# Patient Record
Sex: FEMALE | Race: AMERICAN INDIAN OR ALASKA NATIVE | NOT HISPANIC OR LATINO | URBAN - METROPOLITAN AREA
[De-identification: names, ages, dates, MRNs, and addresses within clinical notes are randomized per-mention and may not be internally consistent; named-entity substitution may affect disease eponyms.]

---

## 2022-05-21 ENCOUNTER — OUTPATIENT (OUTPATIENT)
Dept: OUTPATIENT SERVICES | Facility: HOSPITAL | Age: 32
LOS: 1 days | End: 2022-05-21
Payer: COMMERCIAL

## 2022-05-21 DIAGNOSIS — Z01.818 ENCOUNTER FOR OTHER PREPROCEDURAL EXAMINATION: ICD-10-CM

## 2022-05-21 LAB
ALBUMIN SERPL ELPH-MCNC: 3.6 G/DL — SIGNIFICANT CHANGE UP (ref 3.3–5)
ALP SERPL-CCNC: 117 U/L — SIGNIFICANT CHANGE UP (ref 40–120)
ALT FLD-CCNC: 16 U/L — SIGNIFICANT CHANGE UP (ref 10–45)
ANION GAP SERPL CALC-SCNC: 11 MMOL/L — SIGNIFICANT CHANGE UP (ref 5–17)
APTT BLD: 29.4 SEC — SIGNIFICANT CHANGE UP (ref 27.5–35.5)
AST SERPL-CCNC: 20 U/L — SIGNIFICANT CHANGE UP (ref 10–40)
BILIRUB SERPL-MCNC: 0.4 MG/DL — SIGNIFICANT CHANGE UP (ref 0.2–1.2)
BLD GP AB SCN SERPL QL: NEGATIVE — SIGNIFICANT CHANGE UP
BLD GP AB SCN SERPL QL: NEGATIVE — SIGNIFICANT CHANGE UP
BUN SERPL-MCNC: 12 MG/DL — SIGNIFICANT CHANGE UP (ref 7–23)
CALCIUM SERPL-MCNC: 9.6 MG/DL — SIGNIFICANT CHANGE UP (ref 8.4–10.5)
CHLORIDE SERPL-SCNC: 103 MMOL/L — SIGNIFICANT CHANGE UP (ref 96–108)
CO2 SERPL-SCNC: 21 MMOL/L — LOW (ref 22–31)
CREAT SERPL-MCNC: 0.89 MG/DL — SIGNIFICANT CHANGE UP (ref 0.5–1.3)
EGFR: 88 ML/MIN/1.73M2 — SIGNIFICANT CHANGE UP
GLUCOSE SERPL-MCNC: 81 MG/DL — SIGNIFICANT CHANGE UP (ref 70–99)
HCT VFR BLD CALC: 36.1 % — SIGNIFICANT CHANGE UP (ref 34.5–45)
HGB BLD-MCNC: 11.9 G/DL — SIGNIFICANT CHANGE UP (ref 11.5–15.5)
INR BLD: 0.83 — LOW (ref 0.88–1.16)
MCHC RBC-ENTMCNC: 30.9 PG — SIGNIFICANT CHANGE UP (ref 27–34)
MCHC RBC-ENTMCNC: 33 GM/DL — SIGNIFICANT CHANGE UP (ref 32–36)
MCV RBC AUTO: 93.8 FL — SIGNIFICANT CHANGE UP (ref 80–100)
NRBC # BLD: 0 /100 WBCS — SIGNIFICANT CHANGE UP (ref 0–0)
PLATELET # BLD AUTO: 123 K/UL — LOW (ref 150–400)
POTASSIUM SERPL-MCNC: 4.7 MMOL/L — SIGNIFICANT CHANGE UP (ref 3.5–5.3)
POTASSIUM SERPL-SCNC: 4.7 MMOL/L — SIGNIFICANT CHANGE UP (ref 3.5–5.3)
PROT SERPL-MCNC: 6.9 G/DL — SIGNIFICANT CHANGE UP (ref 6–8.3)
PROTHROM AB SERPL-ACNC: 9.9 SEC — LOW (ref 10.5–13.4)
RBC # BLD: 3.85 M/UL — SIGNIFICANT CHANGE UP (ref 3.8–5.2)
RBC # FLD: 12.5 % — SIGNIFICANT CHANGE UP (ref 10.3–14.5)
RH IG SCN BLD-IMP: POSITIVE — SIGNIFICANT CHANGE UP
RH IG SCN BLD-IMP: POSITIVE — SIGNIFICANT CHANGE UP
SODIUM SERPL-SCNC: 135 MMOL/L — SIGNIFICANT CHANGE UP (ref 135–145)
WBC # BLD: 7.11 K/UL — SIGNIFICANT CHANGE UP (ref 3.8–10.5)
WBC # FLD AUTO: 7.11 K/UL — SIGNIFICANT CHANGE UP (ref 3.8–10.5)

## 2022-05-21 PROCEDURE — 85610 PROTHROMBIN TIME: CPT

## 2022-05-21 PROCEDURE — 80053 COMPREHEN METABOLIC PANEL: CPT

## 2022-05-21 PROCEDURE — 85730 THROMBOPLASTIN TIME PARTIAL: CPT

## 2022-05-21 PROCEDURE — 86900 BLOOD TYPING SEROLOGIC ABO: CPT

## 2022-05-21 PROCEDURE — 86901 BLOOD TYPING SEROLOGIC RH(D): CPT

## 2022-05-21 PROCEDURE — 86780 TREPONEMA PALLIDUM: CPT

## 2022-05-21 PROCEDURE — 86850 RBC ANTIBODY SCREEN: CPT

## 2022-05-21 PROCEDURE — 85027 COMPLETE CBC AUTOMATED: CPT

## 2022-05-22 LAB — T PALLIDUM AB TITR SER: NEGATIVE — SIGNIFICANT CHANGE UP

## 2022-05-23 ENCOUNTER — INPATIENT (INPATIENT)
Facility: HOSPITAL | Age: 32
LOS: 2 days | Discharge: HOME | End: 2022-05-26
Attending: OBSTETRICS & GYNECOLOGY | Admitting: OBSTETRICS & GYNECOLOGY
Payer: COMMERCIAL

## 2022-05-23 ENCOUNTER — RESULT REVIEW (OUTPATIENT)
Age: 32
End: 2022-05-23

## 2022-05-23 VITALS — HEIGHT: 62 IN | WEIGHT: 177.03 LBS

## 2022-05-23 LAB
BASOPHILS # BLD AUTO: 0.02 K/UL — SIGNIFICANT CHANGE UP (ref 0–0.2)
BASOPHILS NFR BLD AUTO: 0.3 % — SIGNIFICANT CHANGE UP (ref 0–2)
BLD GP AB SCN SERPL QL: NEGATIVE — SIGNIFICANT CHANGE UP
COVID-19 SPIKE DOMAIN AB INTERP: POSITIVE
COVID-19 SPIKE DOMAIN ANTIBODY RESULT: 234 U/ML — HIGH
EOSINOPHIL # BLD AUTO: 0.03 K/UL — SIGNIFICANT CHANGE UP (ref 0–0.5)
EOSINOPHIL NFR BLD AUTO: 0.4 % — SIGNIFICANT CHANGE UP (ref 0–6)
GLUCOSE BLDC GLUCOMTR-MCNC: 69 MG/DL — LOW (ref 70–99)
GLUCOSE BLDC GLUCOMTR-MCNC: 79 MG/DL — SIGNIFICANT CHANGE UP (ref 70–99)
GLUCOSE BLDC GLUCOMTR-MCNC: 80 MG/DL — SIGNIFICANT CHANGE UP (ref 70–99)
HCT VFR BLD CALC: 37.6 % — SIGNIFICANT CHANGE UP (ref 34.5–45)
HGB BLD-MCNC: 12.7 G/DL — SIGNIFICANT CHANGE UP (ref 11.5–15.5)
IMM GRANULOCYTES NFR BLD AUTO: 0.6 % — SIGNIFICANT CHANGE UP (ref 0–1.5)
LYMPHOCYTES # BLD AUTO: 1.27 K/UL — SIGNIFICANT CHANGE UP (ref 1–3.3)
LYMPHOCYTES # BLD AUTO: 17.6 % — SIGNIFICANT CHANGE UP (ref 13–44)
MCHC RBC-ENTMCNC: 31.8 PG — SIGNIFICANT CHANGE UP (ref 27–34)
MCHC RBC-ENTMCNC: 33.8 GM/DL — SIGNIFICANT CHANGE UP (ref 32–36)
MCV RBC AUTO: 94 FL — SIGNIFICANT CHANGE UP (ref 80–100)
MONOCYTES # BLD AUTO: 0.44 K/UL — SIGNIFICANT CHANGE UP (ref 0–0.9)
MONOCYTES NFR BLD AUTO: 6.1 % — SIGNIFICANT CHANGE UP (ref 2–14)
NEUTROPHILS # BLD AUTO: 5.43 K/UL — SIGNIFICANT CHANGE UP (ref 1.8–7.4)
NEUTROPHILS NFR BLD AUTO: 75 % — SIGNIFICANT CHANGE UP (ref 43–77)
NRBC # BLD: 0 /100 WBCS — SIGNIFICANT CHANGE UP (ref 0–0)
PLATELET # BLD AUTO: 131 K/UL — LOW (ref 150–400)
RBC # BLD: 4 M/UL — SIGNIFICANT CHANGE UP (ref 3.8–5.2)
RBC # FLD: 12.4 % — SIGNIFICANT CHANGE UP (ref 10.3–14.5)
RH IG SCN BLD-IMP: POSITIVE — SIGNIFICANT CHANGE UP
SARS-COV-2 IGG+IGM SERPL QL IA: 234 U/ML — HIGH
SARS-COV-2 IGG+IGM SERPL QL IA: POSITIVE
T PALLIDUM AB TITR SER: NEGATIVE — SIGNIFICANT CHANGE UP
WBC # BLD: 7.23 K/UL — SIGNIFICANT CHANGE UP (ref 3.8–10.5)
WBC # FLD AUTO: 7.23 K/UL — SIGNIFICANT CHANGE UP (ref 3.8–10.5)

## 2022-05-23 PROCEDURE — 88304 TISSUE EXAM BY PATHOLOGIST: CPT | Mod: 26

## 2022-05-23 PROCEDURE — 88307 TISSUE EXAM BY PATHOLOGIST: CPT | Mod: 26

## 2022-05-23 RX ORDER — MAGNESIUM HYDROXIDE 400 MG/1
30 TABLET, CHEWABLE ORAL
Refills: 0 | Status: DISCONTINUED | OUTPATIENT
Start: 2022-05-23 | End: 2022-05-26

## 2022-05-23 RX ORDER — CITRIC ACID/SODIUM CITRATE 300-500 MG
30 SOLUTION, ORAL ORAL ONCE
Refills: 0 | Status: COMPLETED | OUTPATIENT
Start: 2022-05-23 | End: 2022-05-23

## 2022-05-23 RX ORDER — OXYTOCIN 10 UNIT/ML
333.33 VIAL (ML) INJECTION
Qty: 20 | Refills: 0 | Status: DISCONTINUED | OUTPATIENT
Start: 2022-05-23 | End: 2022-05-23

## 2022-05-23 RX ORDER — KETOROLAC TROMETHAMINE 30 MG/ML
30 SYRINGE (ML) INJECTION EVERY 6 HOURS
Refills: 0 | Status: DISCONTINUED | OUTPATIENT
Start: 2022-05-23 | End: 2022-05-25

## 2022-05-23 RX ORDER — CITRIC ACID/SODIUM CITRATE 300-500 MG
15 SOLUTION, ORAL ORAL EVERY 6 HOURS
Refills: 0 | Status: DISCONTINUED | OUTPATIENT
Start: 2022-05-23 | End: 2022-05-23

## 2022-05-23 RX ORDER — LANOLIN
1 OINTMENT (GRAM) TOPICAL EVERY 6 HOURS
Refills: 0 | Status: DISCONTINUED | OUTPATIENT
Start: 2022-05-23 | End: 2022-05-26

## 2022-05-23 RX ORDER — LEVOTHYROXINE SODIUM 125 MCG
1 TABLET ORAL
Qty: 0 | Refills: 0 | DISCHARGE

## 2022-05-23 RX ORDER — OXYCODONE HYDROCHLORIDE 5 MG/1
5 TABLET ORAL ONCE
Refills: 0 | Status: DISCONTINUED | OUTPATIENT
Start: 2022-05-23 | End: 2022-05-26

## 2022-05-23 RX ORDER — OXYTOCIN 10 UNIT/ML
1 VIAL (ML) INJECTION
Qty: 30 | Refills: 0 | Status: DISCONTINUED | OUTPATIENT
Start: 2022-05-23 | End: 2022-05-26

## 2022-05-23 RX ORDER — OXYTOCIN 10 UNIT/ML
333.33 VIAL (ML) INJECTION
Qty: 20 | Refills: 0 | Status: DISCONTINUED | OUTPATIENT
Start: 2022-05-23 | End: 2022-05-26

## 2022-05-23 RX ORDER — FENTANYL/BUPIVACAINE/NS/PF 2MCG/ML-.1
250 PLASTIC BAG, INJECTION (ML) INJECTION
Refills: 0 | Status: DISCONTINUED | OUTPATIENT
Start: 2022-05-23 | End: 2022-05-23

## 2022-05-23 RX ORDER — CHLORHEXIDINE GLUCONATE 213 G/1000ML
1 SOLUTION TOPICAL DAILY
Refills: 0 | Status: DISCONTINUED | OUTPATIENT
Start: 2022-05-23 | End: 2022-05-25

## 2022-05-23 RX ORDER — DEXAMETHASONE 0.5 MG/5ML
4 ELIXIR ORAL EVERY 6 HOURS
Refills: 0 | Status: DISCONTINUED | OUTPATIENT
Start: 2022-05-23 | End: 2022-05-23

## 2022-05-23 RX ORDER — NALOXONE HYDROCHLORIDE 4 MG/.1ML
0.1 SPRAY NASAL
Refills: 0 | Status: DISCONTINUED | OUTPATIENT
Start: 2022-05-23 | End: 2022-05-23

## 2022-05-23 RX ORDER — IBUPROFEN 200 MG
600 TABLET ORAL EVERY 6 HOURS
Refills: 0 | Status: COMPLETED | OUTPATIENT
Start: 2022-05-23 | End: 2023-04-21

## 2022-05-23 RX ORDER — ACETAMINOPHEN 500 MG
975 TABLET ORAL
Refills: 0 | Status: DISCONTINUED | OUTPATIENT
Start: 2022-05-23 | End: 2022-05-26

## 2022-05-23 RX ORDER — ENOXAPARIN SODIUM 100 MG/ML
40 INJECTION SUBCUTANEOUS EVERY 24 HOURS
Refills: 0 | Status: DISCONTINUED | OUTPATIENT
Start: 2022-05-24 | End: 2022-05-26

## 2022-05-23 RX ORDER — ONDANSETRON 8 MG/1
4 TABLET, FILM COATED ORAL EVERY 6 HOURS
Refills: 0 | Status: DISCONTINUED | OUTPATIENT
Start: 2022-05-23 | End: 2022-05-23

## 2022-05-23 RX ORDER — AZITHROMYCIN 500 MG/1
500 TABLET, FILM COATED ORAL ONCE
Refills: 0 | Status: DISCONTINUED | OUTPATIENT
Start: 2022-05-23 | End: 2022-05-26

## 2022-05-23 RX ORDER — DIPHENHYDRAMINE HCL 50 MG
25 CAPSULE ORAL EVERY 6 HOURS
Refills: 0 | Status: DISCONTINUED | OUTPATIENT
Start: 2022-05-23 | End: 2022-05-26

## 2022-05-23 RX ORDER — TETANUS TOXOID, REDUCED DIPHTHERIA TOXOID AND ACELLULAR PERTUSSIS VACCINE, ADSORBED 5; 2.5; 8; 8; 2.5 [IU]/.5ML; [IU]/.5ML; UG/.5ML; UG/.5ML; UG/.5ML
0.5 SUSPENSION INTRAMUSCULAR ONCE
Refills: 0 | Status: DISCONTINUED | OUTPATIENT
Start: 2022-05-23 | End: 2022-05-26

## 2022-05-23 RX ORDER — SIMETHICONE 80 MG/1
80 TABLET, CHEWABLE ORAL EVERY 4 HOURS
Refills: 0 | Status: DISCONTINUED | OUTPATIENT
Start: 2022-05-23 | End: 2022-05-26

## 2022-05-23 RX ORDER — ASPIRIN/CALCIUM CARB/MAGNESIUM 324 MG
0 TABLET ORAL
Qty: 0 | Refills: 0 | DISCHARGE

## 2022-05-23 RX ORDER — SODIUM CHLORIDE 9 MG/ML
1000 INJECTION, SOLUTION INTRAVENOUS
Refills: 0 | Status: DISCONTINUED | OUTPATIENT
Start: 2022-05-23 | End: 2022-05-26

## 2022-05-23 RX ORDER — OXYCODONE HYDROCHLORIDE 5 MG/1
5 TABLET ORAL
Refills: 0 | Status: DISCONTINUED | OUTPATIENT
Start: 2022-05-23 | End: 2022-05-26

## 2022-05-23 RX ORDER — CEFAZOLIN SODIUM 1 G
2000 VIAL (EA) INJECTION ONCE
Refills: 0 | Status: DISCONTINUED | OUTPATIENT
Start: 2022-05-23 | End: 2022-05-26

## 2022-05-23 RX ORDER — SODIUM CHLORIDE 9 MG/ML
1000 INJECTION, SOLUTION INTRAVENOUS
Refills: 0 | Status: DISCONTINUED | OUTPATIENT
Start: 2022-05-23 | End: 2022-05-23

## 2022-05-23 RX ADMIN — Medication 250 MILLILITER(S): at 14:28

## 2022-05-23 RX ADMIN — SODIUM CHLORIDE 125 MILLILITER(S): 9 INJECTION, SOLUTION INTRAVENOUS at 08:36

## 2022-05-23 RX ADMIN — SODIUM CHLORIDE 125 MILLILITER(S): 9 INJECTION, SOLUTION INTRAVENOUS at 22:45

## 2022-05-23 RX ADMIN — Medication 30 MILLILITER(S): at 21:15

## 2022-05-23 RX ADMIN — Medication 30 MILLIGRAM(S): at 23:35

## 2022-05-23 RX ADMIN — Medication 1 MILLIUNIT(S)/MIN: at 08:51

## 2022-05-23 RX ADMIN — SODIUM CHLORIDE 125 MILLILITER(S): 9 INJECTION, SOLUTION INTRAVENOUS at 12:18

## 2022-05-23 RX ADMIN — Medication 100 MILLIGRAM(S): at 21:15

## 2022-05-23 NOTE — PATIENT PROFILE OB - FALL HARM RISK - UNIVERSAL INTERVENTIONS
Bed in lowest position, wheels locked, appropriate side rails in place/Call bell, personal items and telephone in reach/Instruct patient to call for assistance before getting out of bed or chair/Non-slip footwear when patient is out of bed/[place] to call system/Physically safe environment - no spills, clutter or unnecessary equipment/Purposeful Proactive Rounding/Room/bathroom lighting operational, light cord in reach

## 2022-05-24 LAB
HCT VFR BLD CALC: 24.7 % — LOW (ref 34.5–45)
HGB BLD-MCNC: 8.3 G/DL — LOW (ref 11.5–15.5)
MCHC RBC-ENTMCNC: 31.4 PG — SIGNIFICANT CHANGE UP (ref 27–34)
MCHC RBC-ENTMCNC: 33.6 GM/DL — SIGNIFICANT CHANGE UP (ref 32–36)
MCV RBC AUTO: 93.6 FL — SIGNIFICANT CHANGE UP (ref 80–100)
NRBC # BLD: 0 /100 WBCS — SIGNIFICANT CHANGE UP (ref 0–0)
PLATELET # BLD AUTO: 129 K/UL — LOW (ref 150–400)
RBC # BLD: 2.64 M/UL — LOW (ref 3.8–5.2)
RBC # FLD: 12.4 % — SIGNIFICANT CHANGE UP (ref 10.3–14.5)
WBC # BLD: 15.85 K/UL — HIGH (ref 3.8–10.5)
WBC # FLD AUTO: 15.85 K/UL — HIGH (ref 3.8–10.5)

## 2022-05-24 RX ADMIN — Medication 975 MILLIGRAM(S): at 15:10

## 2022-05-24 RX ADMIN — Medication 975 MILLIGRAM(S): at 18:22

## 2022-05-24 RX ADMIN — Medication 975 MILLIGRAM(S): at 21:30

## 2022-05-24 RX ADMIN — Medication 30 MILLIGRAM(S): at 12:58

## 2022-05-24 RX ADMIN — Medication 30 MILLIGRAM(S): at 18:54

## 2022-05-24 RX ADMIN — ENOXAPARIN SODIUM 40 MILLIGRAM(S): 100 INJECTION SUBCUTANEOUS at 11:39

## 2022-05-24 RX ADMIN — Medication 30 MILLIGRAM(S): at 07:19

## 2022-05-24 RX ADMIN — Medication 30 MILLIGRAM(S): at 06:49

## 2022-05-24 RX ADMIN — Medication 30 MILLIGRAM(S): at 11:39

## 2022-05-24 RX ADMIN — Medication 975 MILLIGRAM(S): at 02:45

## 2022-05-24 RX ADMIN — Medication 975 MILLIGRAM(S): at 02:15

## 2022-05-24 RX ADMIN — Medication 975 MILLIGRAM(S): at 21:03

## 2022-05-24 RX ADMIN — Medication 30 MILLIGRAM(S): at 07:18

## 2022-05-24 RX ADMIN — Medication 30 MILLIGRAM(S): at 06:48

## 2022-05-24 NOTE — LACTATION INITIAL EVALUATION - NS LACT CON REASON FOR REQ
39.1 wk gestation baby, about 15 hrs old at this time. Placed the baby STS with the mother while I provided breastfeeding education and explained normal  behaviour and the milk production feedback system. Assisted with positioning in a football hold on the left breast and taught latch strategies. Baby was able to latch deeply and is feeding well, rhythmically sucking between short pauses of rest. Mother to continue with STS when possible, room-in, and feed as per cues at least 8-12x/ day. To f/u as needed./primaparous mom/staff request

## 2022-05-24 NOTE — LACTATION INITIAL EVALUATION - LACTATION INTERVENTIONS
initiate/review safe skin-to-skin/initiate/review techniques for position and latch/post discharge community resources provided/review techniques to manage sore nipples/engorgement/reviewed components of an effective feeding and at least 8 effective feedings per day required/reviewed importance of monitoring infant diapers, the breastfeeding log, and minimum output each day/reviewed benefits and recommendations for rooming in/reviewed feeding on demand/by cue at least 8 times a day/reviewed indications of inadequate milk transfer that would require supplementation

## 2022-05-25 RX ORDER — POLYETHYLENE GLYCOL 3350 17 G/17G
17 POWDER, FOR SOLUTION ORAL ONCE
Refills: 0 | Status: DISCONTINUED | OUTPATIENT
Start: 2022-05-25 | End: 2022-05-26

## 2022-05-25 RX ORDER — IBUPROFEN 200 MG
600 TABLET ORAL EVERY 6 HOURS
Refills: 0 | Status: DISCONTINUED | OUTPATIENT
Start: 2022-05-25 | End: 2022-05-26

## 2022-05-25 RX ORDER — FERROUS SULFATE 325(65) MG
325 TABLET ORAL
Refills: 0 | Status: DISCONTINUED | OUTPATIENT
Start: 2022-05-25 | End: 2022-05-26

## 2022-05-25 RX ADMIN — Medication 600 MILLIGRAM(S): at 19:06

## 2022-05-25 RX ADMIN — Medication 30 MILLIGRAM(S): at 01:14

## 2022-05-25 RX ADMIN — Medication 600 MILLIGRAM(S): at 17:42

## 2022-05-25 RX ADMIN — Medication 1 APPLICATION(S): at 23:40

## 2022-05-25 RX ADMIN — Medication 30 MILLIGRAM(S): at 06:09

## 2022-05-25 RX ADMIN — Medication 975 MILLIGRAM(S): at 09:00

## 2022-05-25 RX ADMIN — Medication 975 MILLIGRAM(S): at 09:37

## 2022-05-25 RX ADMIN — Medication 30 MILLIGRAM(S): at 00:30

## 2022-05-25 RX ADMIN — Medication 975 MILLIGRAM(S): at 21:26

## 2022-05-25 RX ADMIN — Medication 600 MILLIGRAM(S): at 12:43

## 2022-05-25 RX ADMIN — ENOXAPARIN SODIUM 40 MILLIGRAM(S): 100 INJECTION SUBCUTANEOUS at 11:39

## 2022-05-25 RX ADMIN — Medication 975 MILLIGRAM(S): at 03:40

## 2022-05-25 RX ADMIN — Medication 600 MILLIGRAM(S): at 11:39

## 2022-05-25 RX ADMIN — Medication 975 MILLIGRAM(S): at 16:06

## 2022-05-25 RX ADMIN — Medication 975 MILLIGRAM(S): at 03:10

## 2022-05-25 RX ADMIN — Medication 975 MILLIGRAM(S): at 21:56

## 2022-05-25 RX ADMIN — Medication 975 MILLIGRAM(S): at 15:00

## 2022-05-25 RX ADMIN — Medication 30 MILLIGRAM(S): at 06:30

## 2022-05-25 RX ADMIN — Medication 600 MILLIGRAM(S): at 23:40

## 2022-05-26 VITALS
RESPIRATION RATE: 18 BRPM | OXYGEN SATURATION: 100 % | HEART RATE: 79 BPM | SYSTOLIC BLOOD PRESSURE: 126 MMHG | TEMPERATURE: 98 F | DIASTOLIC BLOOD PRESSURE: 77 MMHG

## 2022-05-26 LAB
HCT VFR BLD CALC: 25.3 % — LOW (ref 34.5–45)
HGB BLD-MCNC: 8.3 G/DL — LOW (ref 11.5–15.5)
MCHC RBC-ENTMCNC: 31.2 PG — SIGNIFICANT CHANGE UP (ref 27–34)
MCHC RBC-ENTMCNC: 32.8 GM/DL — SIGNIFICANT CHANGE UP (ref 32–36)
MCV RBC AUTO: 95.1 FL — SIGNIFICANT CHANGE UP (ref 80–100)
NRBC # BLD: 0 /100 WBCS — SIGNIFICANT CHANGE UP (ref 0–0)
PLATELET # BLD AUTO: 156 K/UL — SIGNIFICANT CHANGE UP (ref 150–400)
RBC # BLD: 2.66 M/UL — LOW (ref 3.8–5.2)
RBC # FLD: 13 % — SIGNIFICANT CHANGE UP (ref 10.3–14.5)
WBC # BLD: 11.41 K/UL — HIGH (ref 3.8–10.5)
WBC # FLD AUTO: 11.41 K/UL — HIGH (ref 3.8–10.5)

## 2022-05-26 PROCEDURE — 88307 TISSUE EXAM BY PATHOLOGIST: CPT

## 2022-05-26 PROCEDURE — 85025 COMPLETE CBC W/AUTO DIFF WBC: CPT

## 2022-05-26 PROCEDURE — 86769 SARS-COV-2 COVID-19 ANTIBODY: CPT

## 2022-05-26 PROCEDURE — 86900 BLOOD TYPING SEROLOGIC ABO: CPT

## 2022-05-26 PROCEDURE — 36415 COLL VENOUS BLD VENIPUNCTURE: CPT

## 2022-05-26 PROCEDURE — 86850 RBC ANTIBODY SCREEN: CPT

## 2022-05-26 PROCEDURE — 85027 COMPLETE CBC AUTOMATED: CPT

## 2022-05-26 PROCEDURE — 86901 BLOOD TYPING SEROLOGIC RH(D): CPT

## 2022-05-26 PROCEDURE — 88304 TISSUE EXAM BY PATHOLOGIST: CPT

## 2022-05-26 PROCEDURE — 86780 TREPONEMA PALLIDUM: CPT

## 2022-05-26 PROCEDURE — 82962 GLUCOSE BLOOD TEST: CPT

## 2022-05-26 PROCEDURE — 59050 FETAL MONITOR W/REPORT: CPT

## 2022-05-26 RX ORDER — IBUPROFEN 200 MG
1 TABLET ORAL
Qty: 0 | Refills: 0 | DISCHARGE
Start: 2022-05-26

## 2022-05-26 RX ORDER — ACETAMINOPHEN 500 MG
3 TABLET ORAL
Qty: 0 | Refills: 0 | DISCHARGE
Start: 2022-05-26

## 2022-05-26 RX ADMIN — Medication 600 MILLIGRAM(S): at 12:27

## 2022-05-26 RX ADMIN — Medication 600 MILLIGRAM(S): at 12:14

## 2022-05-26 RX ADMIN — Medication 975 MILLIGRAM(S): at 03:54

## 2022-05-26 RX ADMIN — Medication 325 MILLIGRAM(S): at 07:45

## 2022-05-26 RX ADMIN — Medication 600 MILLIGRAM(S): at 06:00

## 2022-05-26 RX ADMIN — Medication 975 MILLIGRAM(S): at 09:11

## 2022-05-26 RX ADMIN — Medication 975 MILLIGRAM(S): at 10:24

## 2022-05-26 RX ADMIN — Medication 975 MILLIGRAM(S): at 03:23

## 2022-05-26 RX ADMIN — Medication 600 MILLIGRAM(S): at 05:29

## 2022-05-26 RX ADMIN — Medication 600 MILLIGRAM(S): at 00:10

## 2022-05-26 NOTE — DISCHARGE NOTE OB - CARE PLAN
Principal Discharge DX:	Postpartum state  Assessment and plan of treatment:	POD #2 from primary C section, uncomplicated   1

## 2022-05-26 NOTE — DISCHARGE NOTE OB - HOSPITAL COURSE
POD 2 from an uncomplicated primary C section. Passing all post op milestones and hemodynamically stable for discharge.

## 2022-05-26 NOTE — PROGRESS NOTE ADULT - ASSESSMENT
31y Female POD#1    s/p C/S, Uncomplicated                                       - Neuro/Pain:  toradol atc, tylenol atc, oxy prn  - CV:  VS per routine, AM CBC pending  - Pulm: Encourage ISS & Ambulation  - GI: Advance as tolerated  - : Forrest in place, to be removed this morning, TOV this afternoon  - DVT ppx: SCDs, Lovenox 40mg QD  - Dispo: POD #2/3
31y Female POD#3    s/p C/S, Uncomplicated                                       - Neuro/Pain:  toradol atc, tylenol atc, oxy prn  - CV:  VS per routine  - Pulm: Encourage ISS & Ambulation  - GI: Advance as tolerated  - : Voiding spontaneously  - DVT ppx: SCDs, Lovenox 40mg QD  - Dispo: POD #3
31y Female POD#2    s/p C/S, Uncomplicated                                       - Neuro/Pain:  toradol atc, tylenol atc, oxy prn  - CV:  VS per routine  - Pulm: Encourage ISS & Ambulation  - GI: Advance as tolerated  - : Voiding spontaneously  - DVT ppx: SCDs, Lovenox 40mg QD  - Dispo: POD #2/3

## 2022-05-26 NOTE — PROGRESS NOTE ADULT - SUBJECTIVE AND OBJECTIVE BOX
Patient evaluated at bedside.   She reports pain is well controlled with OPM.  She has been ambulating without assistance, voiding spontaneously, passing gas, tolerating regular diet and is breastfeeding.    She denies HA, dizziness, CP, palpitations, SOB, n/v, or heavy vaginal bleeding.    Physical Exam:  Vital Signs Last 24 Hrs  T(C): 36.4 (25 May 2022 05:55), Max: 36.9 (24 May 2022 18:19)  T(F): 97.6 (25 May 2022 05:55), Max: 98.4 (24 May 2022 18:19)  HR: 101 (25 May 2022 05:55) (62 - 107)  BP: 132/85 (25 May 2022 05:55) (97/63 - 132/85)  BP(mean): --  RR: 17 (25 May 2022 05:55) (17 - 18)  SpO2: 98% (25 May 2022 05:55) (98% - 99%)    Gen: NAD  Abd: + BS, soft, nontender, nondistended, no rebound or guarding  Incision clean, dry and intact  uterus firm at midline  : lochia WNL  Extremities: no swelling or calf tenderness                          8.3    15.85 )-----------( 129      ( 24 May 2022 20:08 )             24.7               
Post-op day 1  Doing well, +flatus, no BM. Pain is well-controlled, breastfeeding  VS- stable, afebrile  Breast- full, lactating. no erythema or nipple crack  Abd- soft, appropriately distended, +BS, Incision dry and clean, no drainage or erythema.  Pelvic- Lochia rubra, mildflow  Ext- 1 pedal edema, Therese's negative  Labs: CBC pending  Imp: Post-op day 1 stable  Plan: Advance diet            Ambulate            Elevate legs  
Post-op day 2  Doing well, +flatus,  BM. Pain is well-controlled, breastfeeding  VS- stable, afebrile  Breast- full, lactating. no erythema or nipple crack  Abd- soft, appropriately distended, +BS, Incision dry and clean, no drainage or erythema.  Pelvic- Lochia rubra, mildflow  Ext- +1pedal edema, Therese's negative  Labs: CBC                      8.3    15.85 )-----------( 129      ( 24 May 2022 20:08 )             24.7     Imp: Post-op day 2 stable  Plan: Abdominal binder            Ambulate            Elevate legs  
Patient evaluated at bedside.   She reports pain is well controlled with OPM.  She has been ambulating without assistance, voiding spontaneously, passing gas, tolerating regular diet and is breastfeeding.    She denies HA, dizziness, CP, palpitations, SOB, n/v, or heavy vaginal bleeding.    Physical Exam:  Vital Signs Last 24 Hrs  T(C): 36.8 (26 May 2022 05:52), Max: 36.8 (26 May 2022 05:52)  T(F): 98.2 (26 May 2022 05:52), Max: 98.2 (26 May 2022 05:52)  HR: 89 (26 May 2022 05:52) (89 - 111)  BP: 119/78 (26 May 2022 05:52) (111/75 - 123/77)  BP(mean): --  RR: 17 (26 May 2022 05:52) (17 - 18)  SpO2: 97% (26 May 2022 05:52) (97% - 100%)    Gen: NAD  Abd: + BS, soft, nontender, nondistended, no rebound or guarding  Incision clean, dry and intact  uterus firm at midline  : lochia WNL  Extremities: no swelling or calf tenderness                          8.3    15.85 )-----------( 129      ( 24 May 2022 20:08 )             24.7               
Post-op day 3  Doing well, +flatus, no BM. Pain is well-controlled, breastfeeding  VS- stable, afebrile  Breast- full, lactating. no erythema or nipple crack  Abd- soft, appropriately distended, +BS, Incision dry and clean, no drainage or erythema.  Pelvic- Lochia rubra, mildflow  Ext- Trace pedal edema, Therese's negative  Labs: CBC  Imp: Post-op day 3 stable  Plan: d/c home            Ambulate            Elevate legs             F/u 1wk for wound check  
Patient evaluated at bedside.   She reports pain is well controlled.  Forrest in place  + Flatus  Not OOB yet.    She denies HA, dizziness, CP, palpitations, SOB, n/v, or heavy vaginal bleeding.    Physical Exam:  Vital Signs Last 24 Hrs  T(C): 36.7 (24 May 2022 05:57), Max: 37.4 (23 May 2022 22:40)  T(F): 98.1 (24 May 2022 05:57), Max: 99.3 (23 May 2022 22:40)  HR: 62 (24 May 2022 05:57) (62 - 122)  BP: 121/75 (24 May 2022 05:57) (89/53 - 130/58)  BP(mean): 84 (24 May 2022 01:15) (67 - 84)  RR: 17 (24 May 2022 05:57) (16 - 17)  SpO2: 96% (24 May 2022 05:57) (96% - 100%)    Gen: NAD  Abd: + BS, soft, nontender, nondistended, no rebound or guarding  Incision clean, dry and intact  uterus firm at midline  : lochia WNL  Extremities: no swelling or calf tenderness                          12.7   7.23  )-----------( 131      ( 23 May 2022 09:09 )             37.6

## 2022-05-26 NOTE — DISCHARGE NOTE OB - CARE PROVIDER_API CALL
Kavon Donaldson)  Obstetrics and Gynecology  [address] [place] [address]  Phone: [phone]  Fax: [phone]  Follow Up Time:

## 2022-05-26 NOTE — DISCHARGE NOTE OB - PATIENT PORTAL LINK FT
You can access the FollowMyHealth Patient Portal offered by [facility] by registering at the following website: [email or web address]. By joining [facility]’s FollowMyHealth portal, you will also be able to view your health information using other applications (apps) compatible with our system.

## 2022-05-26 NOTE — DISCHARGE NOTE OB - NS MD DC FALL RISK RISK
For information on Fall & Injury Prevention, visit: [email or web address] OR  [email or web address] OR  https://www.cdc.gov/steadi/patient.html

## 2022-05-26 NOTE — DISCHARGE NOTE OB - MEDICATION SUMMARY - MEDICATIONS TO TAKE
I will START or STAY ON the medications listed below when I get home from the hospital:    aspirin 81 mg oral tablet  -- Indication: For home    acetaminophen 325 mg oral tablet  -- 3 tab(s) by mouth every 8 hours  -- Indication: For pain    ibuprofen 600 mg oral tablet  -- 1 tab(s) by mouth every 6 hours  -- Indication: For pain    Prena1 oral capsule  -- 1 cap(s) by mouth once a day  -- Indication: For home    levothyroxine 75 mcg (0.075 mg) oral tablet  -- 1 tab(s) by mouth once a day  -- Indication: For thyroid

## 2022-05-28 DIAGNOSIS — Z34.83 ENCOUNTER FOR SUPERVISION OF OTHER NORMAL PREGNANCY, THIRD TRIMESTER: ICD-10-CM

## 2022-05-28 DIAGNOSIS — R60.0 LOCALIZED EDEMA: ICD-10-CM

## 2022-05-28 DIAGNOSIS — Z3A.39 39 WEEKS GESTATION OF PREGNANCY: ICD-10-CM

## 2022-06-02 ENCOUNTER — NON-APPOINTMENT (OUTPATIENT)
Age: 32
End: 2022-06-02

## 2022-06-02 PROBLEM — Z00.00 ENCOUNTER FOR PREVENTIVE HEALTH EXAMINATION: Status: ACTIVE | Noted: 2022-06-02

## 2022-06-15 LAB — SURGICAL PATHOLOGY STUDY: SIGNIFICANT CHANGE UP

## 2025-01-31 ENCOUNTER — OUTPATIENT (OUTPATIENT)
Dept: OUTPATIENT SERVICES | Facility: HOSPITAL | Age: 35
LOS: 1 days | End: 2025-01-31
Payer: COMMERCIAL

## 2025-01-31 DIAGNOSIS — Z01.818 ENCOUNTER FOR OTHER PREPROCEDURAL EXAMINATION: ICD-10-CM

## 2025-01-31 LAB
ALBUMIN SERPL ELPH-MCNC: 3.8 G/DL — SIGNIFICANT CHANGE UP (ref 3.3–5)
ALP SERPL-CCNC: 101 U/L — SIGNIFICANT CHANGE UP (ref 40–120)
ALT FLD-CCNC: 10 U/L — SIGNIFICANT CHANGE UP (ref 10–45)
ANION GAP SERPL CALC-SCNC: 11 MMOL/L — SIGNIFICANT CHANGE UP (ref 5–17)
APTT BLD: 29 SEC — SIGNIFICANT CHANGE UP (ref 24.5–35.6)
AST SERPL-CCNC: 12 U/L — SIGNIFICANT CHANGE UP (ref 10–40)
BILIRUB SERPL-MCNC: 0.3 MG/DL — SIGNIFICANT CHANGE UP (ref 0.2–1.2)
BLD GP AB SCN SERPL QL: NEGATIVE — SIGNIFICANT CHANGE UP
BUN SERPL-MCNC: 8 MG/DL — SIGNIFICANT CHANGE UP (ref 7–23)
CALCIUM SERPL-MCNC: 9 MG/DL — SIGNIFICANT CHANGE UP (ref 8.4–10.5)
CHLORIDE SERPL-SCNC: 104 MMOL/L — SIGNIFICANT CHANGE UP (ref 96–108)
CO2 SERPL-SCNC: 23 MMOL/L — SIGNIFICANT CHANGE UP (ref 22–31)
CREAT SERPL-MCNC: 0.83 MG/DL — SIGNIFICANT CHANGE UP (ref 0.5–1.3)
EGFR: 94 ML/MIN/1.73M2 — SIGNIFICANT CHANGE UP
GLUCOSE SERPL-MCNC: 71 MG/DL — SIGNIFICANT CHANGE UP (ref 70–99)
HCT VFR BLD CALC: 36.6 % — SIGNIFICANT CHANGE UP (ref 34.5–45)
HGB BLD-MCNC: 11.5 G/DL — SIGNIFICANT CHANGE UP (ref 11.5–15.5)
INR BLD: 0.86 — SIGNIFICANT CHANGE UP (ref 0.85–1.16)
MCHC RBC-ENTMCNC: 29.6 PG — SIGNIFICANT CHANGE UP (ref 27–34)
MCHC RBC-ENTMCNC: 31.4 G/DL — LOW (ref 32–36)
MCV RBC AUTO: 94.3 FL — SIGNIFICANT CHANGE UP (ref 80–100)
NRBC # BLD: 0 /100 WBCS — SIGNIFICANT CHANGE UP (ref 0–0)
NRBC BLD-RTO: 0 /100 WBCS — SIGNIFICANT CHANGE UP (ref 0–0)
PLATELET # BLD AUTO: 191 K/UL — SIGNIFICANT CHANGE UP (ref 150–400)
POTASSIUM SERPL-MCNC: 4.6 MMOL/L — SIGNIFICANT CHANGE UP (ref 3.5–5.3)
POTASSIUM SERPL-SCNC: 4.6 MMOL/L — SIGNIFICANT CHANGE UP (ref 3.5–5.3)
PROT SERPL-MCNC: 6.8 G/DL — SIGNIFICANT CHANGE UP (ref 6–8.3)
PROTHROM AB SERPL-ACNC: 10.1 SEC — SIGNIFICANT CHANGE UP (ref 9.9–13.4)
RBC # BLD: 3.88 M/UL — SIGNIFICANT CHANGE UP (ref 3.8–5.2)
RBC # FLD: 12.4 % — SIGNIFICANT CHANGE UP (ref 10.3–14.5)
RH IG SCN BLD-IMP: POSITIVE — SIGNIFICANT CHANGE UP
SODIUM SERPL-SCNC: 138 MMOL/L — SIGNIFICANT CHANGE UP (ref 135–145)
WBC # BLD: 10.19 K/UL — SIGNIFICANT CHANGE UP (ref 3.8–10.5)
WBC # FLD AUTO: 10.19 K/UL — SIGNIFICANT CHANGE UP (ref 3.8–10.5)

## 2025-01-31 PROCEDURE — 86780 TREPONEMA PALLIDUM: CPT

## 2025-01-31 PROCEDURE — 86850 RBC ANTIBODY SCREEN: CPT

## 2025-01-31 PROCEDURE — 85730 THROMBOPLASTIN TIME PARTIAL: CPT

## 2025-01-31 PROCEDURE — 86901 BLOOD TYPING SEROLOGIC RH(D): CPT

## 2025-01-31 PROCEDURE — 80053 COMPREHEN METABOLIC PANEL: CPT

## 2025-01-31 PROCEDURE — 86900 BLOOD TYPING SEROLOGIC ABO: CPT

## 2025-01-31 PROCEDURE — 85027 COMPLETE CBC AUTOMATED: CPT

## 2025-01-31 PROCEDURE — 85610 PROTHROMBIN TIME: CPT

## 2025-02-01 LAB — T PALLIDUM AB TITR SER: NEGATIVE — SIGNIFICANT CHANGE UP

## 2025-02-03 ENCOUNTER — INPATIENT (INPATIENT)
Facility: HOSPITAL | Age: 35
LOS: 2 days | Discharge: HOME | End: 2025-02-06
Attending: OBSTETRICS & GYNECOLOGY | Admitting: OBSTETRICS & GYNECOLOGY
Payer: COMMERCIAL

## 2025-02-03 VITALS
SYSTOLIC BLOOD PRESSURE: 120 MMHG | HEIGHT: 61 IN | RESPIRATION RATE: 18 BRPM | HEART RATE: 91 BPM | TEMPERATURE: 99 F | WEIGHT: 190.92 LBS | DIASTOLIC BLOOD PRESSURE: 72 MMHG | OXYGEN SATURATION: 99 %

## 2025-02-03 LAB
BLD GP AB SCN SERPL QL: NEGATIVE — SIGNIFICANT CHANGE UP
RH IG SCN BLD-IMP: POSITIVE — SIGNIFICANT CHANGE UP

## 2025-02-03 DEVICE — ARISTA 3GR: Type: IMPLANTABLE DEVICE | Status: FUNCTIONAL

## 2025-02-03 RX ORDER — ACETAMINOPHEN 160 MG/5ML
1000 SUSPENSION ORAL ONCE
Refills: 0 | Status: COMPLETED | OUTPATIENT
Start: 2025-02-03 | End: 2025-02-03

## 2025-02-03 RX ORDER — FAMOTIDINE 10 MG/ML
20 INJECTION INTRAVENOUS ONCE
Refills: 0 | Status: COMPLETED | OUTPATIENT
Start: 2025-02-03 | End: 2025-02-03

## 2025-02-03 RX ORDER — DIPHENHYDRAMINE HCL 25 MG
25 CAPSULE ORAL EVERY 6 HOURS
Refills: 0 | Status: DISCONTINUED | OUTPATIENT
Start: 2025-02-03 | End: 2025-02-06

## 2025-02-03 RX ORDER — ENOXAPARIN SODIUM 100 MG/ML
40 INJECTION SUBCUTANEOUS EVERY 12 HOURS
Refills: 0 | Status: DISCONTINUED | OUTPATIENT
Start: 2025-02-04 | End: 2025-02-06

## 2025-02-03 RX ORDER — CLOSTRIDIUM TETANI TOXOID ANTIGEN (FORMALDEHYDE INACTIVATED), CORYNEBACTERIUM DIPHTHERIAE TOXOID ANTIGEN (FORMALDEHYDE INACTIVATED), BORDETELLA PERTUSSIS TOXOID ANTIGEN (GLUTARALDEHYDE INACTIVATED), BORDETELLA PERTUSSIS FILAMENTOUS HEMAGGLUTININ ANTIGEN (FORMALDEHYDE INACTIVATED), BORDETELLA PERTUSSIS PERTACTIN ANTIGEN, AND BORDETELLA PERTUSSIS FIMBRIAE 2/3 ANTIGEN 5; 2; 2.5; 5; 3; 5 [LF]/.5ML; [LF]/.5ML; UG/.5ML; UG/.5ML; UG/.5ML; UG/.5ML
0.5 INJECTION, SUSPENSION INTRAMUSCULAR ONCE
Refills: 0 | Status: DISCONTINUED | OUTPATIENT
Start: 2025-02-03 | End: 2025-02-06

## 2025-02-03 RX ORDER — SODIUM CHLORIDE 9 G/ML
1000 INJECTION, SOLUTION INTRAVENOUS
Refills: 0 | Status: DISCONTINUED | OUTPATIENT
Start: 2025-02-03 | End: 2025-02-03

## 2025-02-03 RX ORDER — OXYCODONE HYDROCHLORIDE 30 MG/1
5 TABLET ORAL ONCE
Refills: 0 | Status: DISCONTINUED | OUTPATIENT
Start: 2025-02-03 | End: 2025-02-06

## 2025-02-03 RX ORDER — IBUPROFEN 600 MG/1
600 TABLET, FILM COATED ORAL EVERY 6 HOURS
Refills: 0 | Status: COMPLETED | OUTPATIENT
Start: 2025-02-03 | End: 2026-01-02

## 2025-02-03 RX ORDER — CEFAZOLIN SODIUM IN 0.9 % NACL 2 G/10 ML
2000 SYRINGE (ML) INTRAVENOUS ONCE
Refills: 0 | Status: COMPLETED | OUTPATIENT
Start: 2025-02-03 | End: 2025-02-03

## 2025-02-03 RX ORDER — ANTISEPTIC SURGICAL SCRUB 0.04 MG/ML
1 SOLUTION TOPICAL DAILY
Refills: 0 | Status: DISCONTINUED | OUTPATIENT
Start: 2025-02-03 | End: 2025-02-03

## 2025-02-03 RX ORDER — MAGNESIUM HYDROXIDE 400 MG/5ML
30 SUSPENSION, ORAL (FINAL DOSE FORM) ORAL
Refills: 0 | Status: DISCONTINUED | OUTPATIENT
Start: 2025-02-03 | End: 2025-02-06

## 2025-02-03 RX ORDER — SODIUM CHLORIDE 9 G/ML
1000 INJECTION, SOLUTION INTRAVENOUS
Refills: 0 | Status: DISCONTINUED | OUTPATIENT
Start: 2025-02-03 | End: 2025-02-06

## 2025-02-03 RX ORDER — KETOROLAC TROMETHAMINE 10 MG
30 TABLET ORAL EVERY 6 HOURS
Refills: 0 | Status: DISCONTINUED | OUTPATIENT
Start: 2025-02-03 | End: 2025-02-04

## 2025-02-03 RX ORDER — DEXAMETHASONE SODIUM PHOSPHATE 4 MG/ML
4 INJECTION, SOLUTION INTRA-ARTICULAR; INTRALESIONAL; INTRAMUSCULAR; INTRAVENOUS; SOFT TISSUE EVERY 6 HOURS
Refills: 0 | Status: DISCONTINUED | OUTPATIENT
Start: 2025-02-03 | End: 2025-02-06

## 2025-02-03 RX ORDER — ONDANSETRON 4 MG/1
4 TABLET, ORALLY DISINTEGRATING ORAL EVERY 6 HOURS
Refills: 0 | Status: DISCONTINUED | OUTPATIENT
Start: 2025-02-03 | End: 2025-02-06

## 2025-02-03 RX ORDER — ACETAMINOPHEN 160 MG/5ML
975 SUSPENSION ORAL
Refills: 0 | Status: DISCONTINUED | OUTPATIENT
Start: 2025-02-03 | End: 2025-02-06

## 2025-02-03 RX ORDER — SODIUM CITRATE AND CITRIC ACID MONOHYDRATE 1002; 1500 MG/15ML; MG/15ML
30 SOLUTION ORAL ONCE
Refills: 0 | Status: COMPLETED | OUTPATIENT
Start: 2025-02-03 | End: 2025-02-03

## 2025-02-03 RX ORDER — OXYTOCIN/0.9 % SODIUM CHLORIDE 10/500ML
42 PLASTIC BAG, INJECTION (ML) INTRAVENOUS
Qty: 30 | Refills: 0 | Status: DISCONTINUED | OUTPATIENT
Start: 2025-02-03 | End: 2025-02-06

## 2025-02-03 RX ORDER — OXYCODONE HYDROCHLORIDE 30 MG/1
5 TABLET ORAL
Refills: 0 | Status: DISCONTINUED | OUTPATIENT
Start: 2025-02-03 | End: 2025-02-06

## 2025-02-03 RX ORDER — SODIUM CHLORIDE 9 G/ML
500 INJECTION, SOLUTION INTRAVENOUS
Refills: 0 | Status: DISCONTINUED | OUTPATIENT
Start: 2025-02-03 | End: 2025-02-06

## 2025-02-03 RX ORDER — NALOXONE HYDROCHLORIDE 3 MG/.1ML
0.1 SPRAY NASAL
Refills: 0 | Status: DISCONTINUED | OUTPATIENT
Start: 2025-02-03 | End: 2025-02-06

## 2025-02-03 RX ADMIN — Medication 30 MILLIGRAM(S): at 23:48

## 2025-02-03 RX ADMIN — Medication 80 MILLIGRAM(S): at 17:10

## 2025-02-03 RX ADMIN — Medication 100 MILLIGRAM(S): at 10:30

## 2025-02-03 RX ADMIN — ACETAMINOPHEN 975 MILLIGRAM(S): 160 SUSPENSION ORAL at 20:40

## 2025-02-03 RX ADMIN — SODIUM CHLORIDE 999 MILLILITER(S): 9 INJECTION, SOLUTION INTRAVENOUS at 20:11

## 2025-02-03 RX ADMIN — ACETAMINOPHEN 975 MILLIGRAM(S): 160 SUSPENSION ORAL at 17:11

## 2025-02-03 RX ADMIN — Medication 80 MILLIGRAM(S): at 22:02

## 2025-02-03 RX ADMIN — SODIUM CITRATE AND CITRIC ACID MONOHYDRATE 30 MILLILITER(S): 1002; 1500 SOLUTION ORAL at 10:15

## 2025-02-03 RX ADMIN — ACETAMINOPHEN 400 MILLIGRAM(S): 160 SUSPENSION ORAL at 13:06

## 2025-02-03 RX ADMIN — FAMOTIDINE 20 MILLIGRAM(S): 10 INJECTION INTRAVENOUS at 10:00

## 2025-02-03 RX ADMIN — Medication 30 MILLIGRAM(S): at 14:00

## 2025-02-03 NOTE — OB PROVIDER DELIVERY SUMMARY - NSPROVIDERDELIVERYNOTE_OBGYN_ALL_OB_FT
Attending Note  Pt underwent a repeat c/section  over LFT with delivery of live male  with nuchal cordX1. Uterus closed in two layers.. Abdominal closure per routine and skin for subcuticular closure.  Pt tolerated procedure well. Taken to RR in good condition  EBL-800cc   to [facility]

## 2025-02-03 NOTE — OB RN DELIVERY SUMMARY - NSSELHIDDEN_OBGYN_ALL_OB_FT
[NS_DeliveryAttending1_OBGYN_ALL_OB_FT:[id number]==],[NS_DeliveryAssist1_OBGYN_ALL_OB_FT:[id number]],[NS_DeliveryRN_OBGYN_ALL_OB_FT:[id number]],[NS_CirculateRN2_OBGYN_ALL_OB_FT:[id number]]

## 2025-02-03 NOTE — OB PROVIDER H&P - ASSESSMENT
33yo  at 39w0d presents for scheduled repeat c section.  - Admit to L&D  - NPO and IVF  - Routine labs. Prenatal labs reviewed, as above.   - Ancef for infection ppx   - Anesthesia consulted   - Fetal status reassuring, continuous efm and toco.   - Plan to go to OR for scheduled c/s.  Discussed all r/b/a, consent signed. T&S active     D/w Dr. Donaldson 35yo  at 39w0d presents for scheduled repeat c section.  - Admit to L&D  - NPO and IVF  - Routine labs. Prenatal labs reviewed, as above.   - Ancef for infection ppx   - Anesthesia consulted   - Fetal status reassuring, continuous efm and toco.   - Plan to go to OR for scheduled c/s.  Discussed all r/b/a, consent signed. T&S active     D/w Dr. Donaldson  Attending Note  34y/0  at 39wks  previous c/section admitted elective repeat. Pregnancy uncomplicated, OGTT wnl. size > date with suspected LGA.  All prenatal labs normal and NIPT normal male, GBS=negative. + hypothyroidism  ''  no contractions.  Imp: IUP term/LGA/hypothyroidism  Plan- consent obtained, risk and complications discussed.         Anesthesia notified

## 2025-02-03 NOTE — OB RN INTRAOPERATIVE NOTE - NSSELHIDDEN_OBGYN_ALL_OB_FT
[NS_DeliveryAttending1_OBGYN_ALL_OB_FT:[id number]],[NS_DeliveryAssist1_OBGYN_ALL_OB_FT:[id number]],[NS_DeliveryRN_OBGYN_ALL_OB_FT:[id number]],[NS_CirculateRN2_OBGYN_ALL_OB_FT:[id number]=]

## 2025-02-03 NOTE — OB RN DELIVERY SUMMARY - NS_SEPSISRSKCALC_OBGYN_ALL_OB_FT
EOS calculated successfully. EOS Risk Factor: 0.5/1000 live births ([facility] national incidence); GA=39w;Temp=99.3; ROM=0.017; GBS='Negative'; Antibiotics='Broad spectrum antibiotics 2-3.9 hrs prior to birth'

## 2025-02-03 NOTE — CHART NOTE - NSCHARTNOTEFT_GEN_A_CORE
pt seen at bedside after report of saturating pad  seen at bedside  fundus firm, midline, below umbilicus  TAUS with no retained products, thin uterine stripe  no bleeding with fundal pressure  pt denies dizziness, VSS  of note has low UOP last 2 hrs (25cc/hr) encouraged PO intake as she is tolerating - can consdier bolus if persistent    Fer PGY3, Dr Donaldson made aware pt seen at bedside after report of saturating pad  seen at bedside  fundus firm, midline, below umbilicus  TAUS with no retained products, thin uterine stripe  no bleeding with fundal pressure  pt denies dizziness, VSS  of note has low UOP last 1 hrs (25cc/hr), previously adequate, encouraged PO intake as she is tolerating - can consider bolus if persistent    Fer PGY3, Dr Donaldson made aware

## 2025-02-03 NOTE — OB PROVIDER H&P - HISTORY OF PRESENT ILLNESS
NADIA [id number]  S: 34yFemale G** P** @ **w**d presents for scheduled repeat c section.     Antepartum  Spontaneous, NIPT wnl, anatomy scan wnl, passed GCT, GBS ***, EFW ***g    Ob: denies  GYN: denies  PMHx: denies  Surg: denies  Meds: denies  No Known Allergies      PE  T(C): --  HR: --  BP: --  RR: --  SpO2: --  General: NAD  Abdomen: Soft; Nontender; Gravid  SVE:   SSE:     NST: baseline , mod variability, +accels, -decels, cat I  [place]: no contractions   TAUS: cephalic presentation        NADIA [id number]  S: 33yo  at 39w0d presents for scheduled repeat c section. She reports +FM, denies contractions, LOF, ROM, and vaginal bleeding.    Antepartum  Spontaneous, NIPT wnl, anatomy scan wnl, passed GCT, GBS negative, EFW 3855g    Ob: : stat c section in  for NRFHT, 3600g  GYN: hx of small fibroid of unknown size and location, denies hx of abnormal paps, ovarian cysts, STIs  PMHx: hypothyroid  Surg: denies  Meds: PNV, 75mcg synthroid  No Known Allergies      PE    General: NAD  Abdomen: Soft; Nontender; Gravid  SVE: deferred    NST: baseline , mod variability, +accels, -decels, cat I  [place]: no contractions   TAUS: cephalic presentation

## 2025-02-03 NOTE — OB RN INTRAOPERATIVE NOTE - NS_IMPLANTS_ARISTA EXPIRATION DATE_OBGYN_ALL_OB_DT
WHY DO I PEE ALL THE TIME?  Frequent urination in men may be caused by diseases affecting the urinary tract at any level. The urinary tract includes the kidneys, the tubes connecting the kidneys to the bladder (ureters), the bladder, and the duct through which urine flows from the bladder out of the body (urethra).    Several factors may be linked to frequent urination, such as:    Infection, disease, injury or irritation of the bladder  Conditions that increase urine production  Changes in muscles, nerves or other tissues affecting bladder function  Certain cancer treatments  Drugs or beverages that increase urine production    Depending on what’s causing your frequent urination, you may experience other urinary problems, such as:    Pain or discomfort during urination  A strong urge to urinate  Difficulty urinating  Loss of bladder control  Unusual urine color  Certain things are more common for men than others, and Dr. Latham will test for the most common causes first. Specific diseases, conditions or other causes of frequent urination in men include the following (click through to learn more on each):    Benign prostatic hyperplasia (BPH)  Bladder stones  Interstitial cystitis (also called painful bladder syndrome)  Overactive bladder  Prostatitis (infection or inflammation of the prostate)  Urethral stricture (narrowing of the urethra)  Urinary tract infection (UTI)  
28-Jun-2029

## 2025-02-03 NOTE — PROVIDER CONTACT NOTE (OTHER) - SITUATION
pt saturated pad in 1 hour @ 5pm, changed pad. md canela notified regarding bleeding and low urine output.   1900 pt was sleeping, checked again and bleeding saturated 1 pad + urine output remains 50c

## 2025-02-03 NOTE — OB PROVIDER DELIVERY SUMMARY - NSSELHIDDEN_OBGYN_ALL_OB_FT
[NS_DeliveryAttending1_OBGYN_ALL_OB_FT:[id number]],[NS_DeliveryAssist1_OBGYN_ALL_OB_FT:[id number]],[NS_DeliveryRN_OBGYN_ALL_OB_FT:[id number]],[NS_CirculateRN2_OBGYN_ALL_OB_FT:[id number]=]

## 2025-02-03 NOTE — PRE-ANESTHESIA EVALUATION ADULT - NSANTHOSAYNRD_GEN_A_CORE
No. SUNNI screening performed.  STOP BANG Legend: 0-2 = LOW Risk; 3-4 = INTERMEDIATE Risk; 5-8 = HIGH Risk

## 2025-02-04 LAB
ALBUMIN SERPL ELPH-MCNC: 2.7 G/DL — LOW (ref 3.3–5)
ALP SERPL-CCNC: 70 U/L — SIGNIFICANT CHANGE UP (ref 40–120)
ALT FLD-CCNC: 9 U/L — LOW (ref 10–45)
ANION GAP SERPL CALC-SCNC: 9 MMOL/L — SIGNIFICANT CHANGE UP (ref 5–17)
AST SERPL-CCNC: 19 U/L — SIGNIFICANT CHANGE UP (ref 10–40)
BASOPHILS # BLD AUTO: 0.02 K/UL — SIGNIFICANT CHANGE UP (ref 0–0.2)
BASOPHILS NFR BLD AUTO: 0.2 % — SIGNIFICANT CHANGE UP (ref 0–2)
BILIRUB SERPL-MCNC: 0.4 MG/DL — SIGNIFICANT CHANGE UP (ref 0.2–1.2)
BUN SERPL-MCNC: 6 MG/DL — LOW (ref 7–23)
CALCIUM SERPL-MCNC: 8.6 MG/DL — SIGNIFICANT CHANGE UP (ref 8.4–10.5)
CHLORIDE SERPL-SCNC: 105 MMOL/L — SIGNIFICANT CHANGE UP (ref 96–108)
CO2 SERPL-SCNC: 21 MMOL/L — LOW (ref 22–31)
CREAT SERPL-MCNC: 0.58 MG/DL — SIGNIFICANT CHANGE UP (ref 0.5–1.3)
EGFR: 120 ML/MIN/1.73M2 — SIGNIFICANT CHANGE UP
EOSINOPHIL # BLD AUTO: 0.06 K/UL — SIGNIFICANT CHANGE UP (ref 0–0.5)
EOSINOPHIL NFR BLD AUTO: 0.7 % — SIGNIFICANT CHANGE UP (ref 0–6)
GLUCOSE SERPL-MCNC: 86 MG/DL — SIGNIFICANT CHANGE UP (ref 70–99)
HCT VFR BLD CALC: 26.4 % — LOW (ref 34.5–45)
HGB BLD-MCNC: 8.7 G/DL — LOW (ref 11.5–15.5)
IMM GRANULOCYTES NFR BLD AUTO: 0.6 % — SIGNIFICANT CHANGE UP (ref 0–0.9)
LYMPHOCYTES # BLD AUTO: 1.22 K/UL — SIGNIFICANT CHANGE UP (ref 1–3.3)
LYMPHOCYTES # BLD AUTO: 13.7 % — SIGNIFICANT CHANGE UP (ref 13–44)
MCHC RBC-ENTMCNC: 29.9 PG — SIGNIFICANT CHANGE UP (ref 27–34)
MCHC RBC-ENTMCNC: 33 G/DL — SIGNIFICANT CHANGE UP (ref 32–36)
MCV RBC AUTO: 90.7 FL — SIGNIFICANT CHANGE UP (ref 80–100)
MONOCYTES # BLD AUTO: 0.49 K/UL — SIGNIFICANT CHANGE UP (ref 0–0.9)
MONOCYTES NFR BLD AUTO: 5.5 % — SIGNIFICANT CHANGE UP (ref 2–14)
NEUTROPHILS # BLD AUTO: 7.08 K/UL — SIGNIFICANT CHANGE UP (ref 1.8–7.4)
NEUTROPHILS NFR BLD AUTO: 79.3 % — HIGH (ref 43–77)
NRBC # BLD: 0 /100 WBCS — SIGNIFICANT CHANGE UP (ref 0–0)
NRBC BLD-RTO: 0 /100 WBCS — SIGNIFICANT CHANGE UP (ref 0–0)
PLATELET # BLD AUTO: 147 K/UL — LOW (ref 150–400)
POTASSIUM SERPL-MCNC: 3.9 MMOL/L — SIGNIFICANT CHANGE UP (ref 3.5–5.3)
POTASSIUM SERPL-SCNC: 3.9 MMOL/L — SIGNIFICANT CHANGE UP (ref 3.5–5.3)
PROT SERPL-MCNC: 5.1 G/DL — LOW (ref 6–8.3)
RBC # BLD: 2.91 M/UL — LOW (ref 3.8–5.2)
RBC # FLD: 12.3 % — SIGNIFICANT CHANGE UP (ref 10.3–14.5)
SODIUM SERPL-SCNC: 135 MMOL/L — SIGNIFICANT CHANGE UP (ref 135–145)
WBC # BLD: 8.92 K/UL — SIGNIFICANT CHANGE UP (ref 3.8–10.5)
WBC # FLD AUTO: 8.92 K/UL — SIGNIFICANT CHANGE UP (ref 3.8–10.5)

## 2025-02-04 RX ORDER — LEVOTHYROXINE SODIUM 25 UG/1
75 TABLET ORAL DAILY
Refills: 0 | Status: DISCONTINUED | OUTPATIENT
Start: 2025-02-04 | End: 2025-02-06

## 2025-02-04 RX ORDER — IRON SUCROSE 20 MG/ML
300 INJECTION, SOLUTION INTRAVENOUS EVERY 24 HOURS
Refills: 0 | Status: COMPLETED | OUTPATIENT
Start: 2025-02-04 | End: 2025-02-06

## 2025-02-04 RX ORDER — IBUPROFEN 600 MG/1
600 TABLET, FILM COATED ORAL EVERY 6 HOURS
Refills: 0 | Status: DISCONTINUED | OUTPATIENT
Start: 2025-02-04 | End: 2025-02-06

## 2025-02-04 RX ADMIN — ACETAMINOPHEN 975 MILLIGRAM(S): 160 SUSPENSION ORAL at 14:30

## 2025-02-04 RX ADMIN — Medication 1 APPLICATION(S): at 20:56

## 2025-02-04 RX ADMIN — IBUPROFEN 600 MILLIGRAM(S): 600 TABLET, FILM COATED ORAL at 18:13

## 2025-02-04 RX ADMIN — LEVOTHYROXINE SODIUM 75 MICROGRAM(S): 25 TABLET ORAL at 07:06

## 2025-02-04 RX ADMIN — ACETAMINOPHEN 975 MILLIGRAM(S): 160 SUSPENSION ORAL at 09:46

## 2025-02-04 RX ADMIN — ENOXAPARIN SODIUM 40 MILLIGRAM(S): 100 INJECTION SUBCUTANEOUS at 22:02

## 2025-02-04 RX ADMIN — Medication 30 MILLIGRAM(S): at 06:10

## 2025-02-04 RX ADMIN — ENOXAPARIN SODIUM 40 MILLIGRAM(S): 100 INJECTION SUBCUTANEOUS at 09:40

## 2025-02-04 RX ADMIN — Medication 30 MILLIGRAM(S): at 11:36

## 2025-02-04 RX ADMIN — ACETAMINOPHEN 975 MILLIGRAM(S): 160 SUSPENSION ORAL at 02:30

## 2025-02-04 RX ADMIN — ACETAMINOPHEN 975 MILLIGRAM(S): 160 SUSPENSION ORAL at 20:56

## 2025-02-04 RX ADMIN — IRON SUCROSE 176.67 MILLIGRAM(S): 20 INJECTION, SOLUTION INTRAVENOUS at 11:36

## 2025-02-04 RX ADMIN — Medication 80 MILLIGRAM(S): at 06:22

## 2025-02-04 NOTE — PROGRESS NOTE ADULT - ASSESSMENT
A/P: 34y POD1 s/p  section. Pt is hemodynamically stable.   -  Neuro-Pain control with motrin/acetaminophen, oxycodone for severe pain PRN  -  Cardio: no issues. Continue to monitor routinely   -  GI: Clear/Reg diet, Reglan/Zofran prn   -  : s/p suresh- low urine output overnight x 1 hour (25ccs/hour). s/p 500cc bolus, oliguria resolved.   -  DVT prophylaxis: Lovenox 40mg qd, SCDs  -  Activity- ambulated as tolerated   -  Heme: post-op hemoglobin pending  Dispo: POD 3 or 4

## 2025-02-04 NOTE — LACTATION INITIAL EVALUATION - NS LACT CON REASON FOR REQ
39 wk baby seen around 24 hours of life. Upon my entrance to the room, baby is crying in the crib, swaddled. Mother reports that baby falls asleep easily at the breast. Baby sucks vigorously on a gloved finger and displays hunger cues when the swaddle is removed. Mother is educated on hunger cues and importance of skin to skin holding/ the benefit of nursing a  while skin to skin to encourage wakefulness/sustaining at the breast for longer periods. Complete breastfeeding education was provided, manual expression technique taught, colostrum is ample and easily expressible bilaterally. Mother was shown how to provide drops of colostrum on her finger to baby to promote interest and wakefulness prior to latching baby to the breast. Positioning and latch strategies were discussed/demonstrated in cross cradle hold. Baby deeply latches and develops an organized, rhythmic suck with intermittent swallows noted. Baby continues to feed for 15+min and mother confirms a strong pull of the nipple and denies discomfort.  Responsive/ frequent feeds, continued/ increased skin to skin holding, and rooming-in were encouraged. Expectations surrounding intake and output at this point and moving forward were discussed.  Implications of a shallow vs deep latch were also discussed with regard to milk transfer and maternal comfort/nipple integrity. Mother was shown how to also provide breast compression as baby is nursing to promote further colostrum transfer and eagerness at the breast. Sore nipple care was also discussed.  All questions were answered, mother verbalizes understanding of teaching and info given. Referred for telelactation for continued support in the home setting. My assessment and interventions were discussed with the bedside RN./multiparous mom

## 2025-02-05 RX ORDER — POLYETHYLENE GLYCOL 3350 17 G/17G
17 POWDER, FOR SOLUTION ORAL DAILY
Refills: 0 | Status: DISCONTINUED | OUTPATIENT
Start: 2025-02-05 | End: 2025-02-06

## 2025-02-05 RX ADMIN — ACETAMINOPHEN 975 MILLIGRAM(S): 160 SUSPENSION ORAL at 09:04

## 2025-02-05 RX ADMIN — IRON SUCROSE 176.67 MILLIGRAM(S): 20 INJECTION, SOLUTION INTRAVENOUS at 11:14

## 2025-02-05 RX ADMIN — IBUPROFEN 600 MILLIGRAM(S): 600 TABLET, FILM COATED ORAL at 17:43

## 2025-02-05 RX ADMIN — ACETAMINOPHEN 975 MILLIGRAM(S): 160 SUSPENSION ORAL at 02:29

## 2025-02-05 RX ADMIN — IBUPROFEN 600 MILLIGRAM(S): 600 TABLET, FILM COATED ORAL at 13:05

## 2025-02-05 RX ADMIN — IBUPROFEN 600 MILLIGRAM(S): 600 TABLET, FILM COATED ORAL at 12:30

## 2025-02-05 RX ADMIN — ACETAMINOPHEN 975 MILLIGRAM(S): 160 SUSPENSION ORAL at 15:41

## 2025-02-05 RX ADMIN — POLYETHYLENE GLYCOL 3350 17 GRAM(S): 17 POWDER, FOR SOLUTION ORAL at 22:34

## 2025-02-05 RX ADMIN — ACETAMINOPHEN 975 MILLIGRAM(S): 160 SUSPENSION ORAL at 10:09

## 2025-02-05 RX ADMIN — ENOXAPARIN SODIUM 40 MILLIGRAM(S): 100 INJECTION SUBCUTANEOUS at 21:19

## 2025-02-05 RX ADMIN — ACETAMINOPHEN 975 MILLIGRAM(S): 160 SUSPENSION ORAL at 15:47

## 2025-02-05 RX ADMIN — IBUPROFEN 600 MILLIGRAM(S): 600 TABLET, FILM COATED ORAL at 05:56

## 2025-02-05 RX ADMIN — LEVOTHYROXINE SODIUM 75 MICROGRAM(S): 25 TABLET ORAL at 05:56

## 2025-02-05 RX ADMIN — ACETAMINOPHEN 975 MILLIGRAM(S): 160 SUSPENSION ORAL at 21:19

## 2025-02-05 RX ADMIN — IBUPROFEN 600 MILLIGRAM(S): 600 TABLET, FILM COATED ORAL at 00:01

## 2025-02-05 RX ADMIN — ENOXAPARIN SODIUM 40 MILLIGRAM(S): 100 INJECTION SUBCUTANEOUS at 09:04

## 2025-02-05 NOTE — PROGRESS NOTE ADULT - ASSESSMENT
A/P: 34y s/p  section, POD #2, stable  -  Pain: PO motrin/acetaminophen, oxycodone for severe pain PRN  -  Post-operatively labs: post-op Hgb stable, receiving IV iron  -  GI: tolerating regular diet, passing gas  -  : voiding spontaneously  -  DVT prophylaxis: ambulation, SCDs, Lovenox  -  Dispo: POD 3 or 4

## 2025-02-06 ENCOUNTER — TRANSCRIPTION ENCOUNTER (OUTPATIENT)
Age: 35
End: 2025-02-06

## 2025-02-06 VITALS
DIASTOLIC BLOOD PRESSURE: 66 MMHG | TEMPERATURE: 98 F | SYSTOLIC BLOOD PRESSURE: 110 MMHG | OXYGEN SATURATION: 98 % | HEART RATE: 80 BPM | RESPIRATION RATE: 18 BRPM

## 2025-02-06 LAB
BASOPHILS # BLD AUTO: 0 K/UL — SIGNIFICANT CHANGE UP (ref 0–0.2)
BASOPHILS NFR BLD AUTO: 0 % — SIGNIFICANT CHANGE UP (ref 0–2)
EOSINOPHIL # BLD AUTO: 0.09 K/UL — SIGNIFICANT CHANGE UP (ref 0–0.5)
EOSINOPHIL NFR BLD AUTO: 0.9 % — SIGNIFICANT CHANGE UP (ref 0–6)
HCT VFR BLD CALC: 24.9 % — LOW (ref 34.5–45)
HGB BLD-MCNC: 8.2 G/DL — LOW (ref 11.5–15.5)
LYMPHOCYTES # BLD AUTO: 1.49 K/UL — SIGNIFICANT CHANGE UP (ref 1–3.3)
LYMPHOCYTES # BLD AUTO: 15 % — SIGNIFICANT CHANGE UP (ref 13–44)
MCHC RBC-ENTMCNC: 31.4 PG — SIGNIFICANT CHANGE UP (ref 27–34)
MCHC RBC-ENTMCNC: 32.9 G/DL — SIGNIFICANT CHANGE UP (ref 32–36)
MCV RBC AUTO: 95.4 FL — SIGNIFICANT CHANGE UP (ref 80–100)
MONOCYTES # BLD AUTO: 0.09 K/UL — SIGNIFICANT CHANGE UP (ref 0–0.9)
MONOCYTES NFR BLD AUTO: 0.9 % — LOW (ref 2–14)
NEUTROPHILS # BLD AUTO: 7.9 K/UL — HIGH (ref 1.8–7.4)
NEUTROPHILS NFR BLD AUTO: 79.6 % — HIGH (ref 43–77)
PLATELET # BLD AUTO: 167 K/UL — SIGNIFICANT CHANGE UP (ref 150–400)
RBC # BLD: 2.61 M/UL — LOW (ref 3.8–5.2)
RBC # FLD: 13 % — SIGNIFICANT CHANGE UP (ref 10.3–14.5)
WBC # BLD: 9.93 K/UL — SIGNIFICANT CHANGE UP (ref 3.8–10.5)
WBC # FLD AUTO: 9.93 K/UL — SIGNIFICANT CHANGE UP (ref 3.8–10.5)

## 2025-02-06 PROCEDURE — 80053 COMPREHEN METABOLIC PANEL: CPT

## 2025-02-06 PROCEDURE — 86901 BLOOD TYPING SEROLOGIC RH(D): CPT

## 2025-02-06 PROCEDURE — 86850 RBC ANTIBODY SCREEN: CPT

## 2025-02-06 PROCEDURE — C1889: CPT

## 2025-02-06 PROCEDURE — 85025 COMPLETE CBC W/AUTO DIFF WBC: CPT

## 2025-02-06 PROCEDURE — 36415 COLL VENOUS BLD VENIPUNCTURE: CPT

## 2025-02-06 PROCEDURE — 59050 FETAL MONITOR W/REPORT: CPT

## 2025-02-06 PROCEDURE — 86900 BLOOD TYPING SEROLOGIC ABO: CPT

## 2025-02-06 RX ORDER — IBUPROFEN 600 MG/1
1 TABLET, FILM COATED ORAL
Qty: 0 | Refills: 0 | DISCHARGE
Start: 2025-02-06

## 2025-02-06 RX ORDER — IBUPROFEN 600 MG/1
1 TABLET, FILM COATED ORAL
Qty: 40 | Refills: 0
Start: 2025-02-06 | End: 2025-02-15

## 2025-02-06 RX ORDER — ACETAMINOPHEN 160 MG/5ML
3 SUSPENSION ORAL
Qty: 0 | Refills: 0 | DISCHARGE
Start: 2025-02-06

## 2025-02-06 RX ADMIN — ACETAMINOPHEN 975 MILLIGRAM(S): 160 SUSPENSION ORAL at 09:14

## 2025-02-06 RX ADMIN — ACETAMINOPHEN 975 MILLIGRAM(S): 160 SUSPENSION ORAL at 02:23

## 2025-02-06 RX ADMIN — ACETAMINOPHEN 975 MILLIGRAM(S): 160 SUSPENSION ORAL at 09:47

## 2025-02-06 RX ADMIN — IBUPROFEN 600 MILLIGRAM(S): 600 TABLET, FILM COATED ORAL at 11:52

## 2025-02-06 RX ADMIN — ENOXAPARIN SODIUM 40 MILLIGRAM(S): 100 INJECTION SUBCUTANEOUS at 09:14

## 2025-02-06 RX ADMIN — IBUPROFEN 600 MILLIGRAM(S): 600 TABLET, FILM COATED ORAL at 12:30

## 2025-02-06 RX ADMIN — IBUPROFEN 600 MILLIGRAM(S): 600 TABLET, FILM COATED ORAL at 00:27

## 2025-02-06 RX ADMIN — IBUPROFEN 600 MILLIGRAM(S): 600 TABLET, FILM COATED ORAL at 05:52

## 2025-02-06 RX ADMIN — LEVOTHYROXINE SODIUM 75 MICROGRAM(S): 25 TABLET ORAL at 05:52

## 2025-02-06 NOTE — DISCHARGE NOTE OB - HOSPITAL COURSE
Admitted for repeat  section.  Uncomplicated surgery and postoperative course.  Acute blood loss anemia noted on post-operative CBC.  Patient stable with normal vital signs.  IV iron administered.

## 2025-02-06 NOTE — PROGRESS NOTE ADULT - SUBJECTIVE AND OBJECTIVE BOX
Patient evaluated at bedside this morning on POD#2, resting comfortable in bed.   She reports pain is well controlled with acetaminophen and ibuprofen.  She denies headache, dizziness, chest pain, palpitations, shortness of breath, nausea, vomiting, or heavy vaginal bleeding.  She has been ambulating without assistance, voiding spontaneously, passing gas, and tolerating regular diet.    Physical Exam:  Vital Signs Last 24 Hrs  T(C): 36.7 (05 Feb 2025 10:09), Max: 36.9 (04 Feb 2025 10:22)  T(F): 98.1 (05 Feb 2025 10:09), Max: 98.5 (04 Feb 2025 10:22)  HR: 78 (05 Feb 2025 10:09) (78 - 100)  BP: 106/- (05 Feb 2025 10:09) (95/64 - 107/70)  BP(mean): --  RR: 18 (05 Feb 2025 10:09) (17 - 20)  SpO2: 99% (05 Feb 2025 10:09) (96% - 99%)    Parameters below as of 05 Feb 2025 10:09  Patient On (Oxygen Delivery Method): room air        GA: comfortable in NAD  Abd: soft, nontender, nondistended, no rebound or guarding, incision clean, dry and intact, uterus firm at midline, 2 fb below umbilicus  : lochia WNL  Extremities: no swelling or calf tenderness                             8.7    8.92  )-----------( 147      ( 04 Feb 2025 05:30 )             26.4     02-04    135  |  105  |  6[L]  ----------------------------<  86  3.9   |  21[L]  |  0.58    Ca    8.6      04 Feb 2025 05:30    TPro  5.1[L]  /  Alb  2.7[L]  /  TBili  0.4  /  DBili  x   /  AST  19  /  ALT  9[L]  /  AlkPhos  70  02-04        
Patient seen and evaluated at bedside. Pt states she has mild abdominal pain, overall controlled with pain medication. Pt seen overnight for bleeding, pt notes that bleeding has improved since saturating a pad.   Pt is tolerating clear diet, and is passing flatus. pt is just s/p suresh catheter and not yet voiding.     Pt is breastfeeding.  She denies headache, dizziness, chest pain, palpitations, shortness of breath, nausea, vomiting, or heavy vaginal bleeding.    Physical Exam:  Vital Signs Last 24 Hrs  T(C): 36.1 (04 Feb 2025 05:50), Max: 37.4 (03 Feb 2025 10:06)  T(F): 97 (04 Feb 2025 05:50), Max: 99.3 (03 Feb 2025 10:06)  HR: 90 (04 Feb 2025 05:50) (84 - 110)  BP: 91/58 (04 Feb 2025 05:50) (90/58 - 120/72)  BP(mean): 76 (03 Feb 2025 13:34) (69 - 80)  RR: 16 (04 Feb 2025 05:50) (16 - 18)  SpO2: 97% (04 Feb 2025 05:50) (95% - 99%)    Parameters below as of 03 Feb 2025 19:10  Patient On (Oxygen Delivery Method): room air        GA: comfortable in NAD  Abd: soft, nontender, nondistended, no rebound or guarding, incision clean, dry and intact, uterus firm at midline, 3 fb below umbilicus  : lochia WNL  Extremities: no swelling or calf tenderness, SCDs in place                  acetaminophen     Tablet .. 975 milliGRAM(s) Oral <User Schedule>  dexAMETHasone  Injectable 4 milliGRAM(s) IV Push every 6 hours PRN  diphenhydrAMINE 25 milliGRAM(s) Oral every 6 hours PRN  diphtheria/tetanus/pertussis (acellular) Vaccine (Adacel) 0.5 milliLiter(s) IntraMuscular once  enoxaparin Injectable 40 milliGRAM(s) SubCutaneous every 12 hours  ibuprofen  Tablet. 600 milliGRAM(s) Oral every 6 hours  ketorolac   Injectable 30 milliGRAM(s) IV Push every 6 hours  lactated ringers. 1000 milliLiter(s) IV Continuous <Continuous>  lactated ringers. 500 milliLiter(s) IV Continuous <Continuous>  lanolin Ointment 1 Application(s) Topical every 6 hours PRN  levothyroxine 75 MICROGram(s) Oral daily  magnesium hydroxide Suspension 30 milliLiter(s) Oral two times a day PRN  naloxone Injectable 0.1 milliGRAM(s) IV Push every 3 minutes PRN  ondansetron Injectable 4 milliGRAM(s) IV Push every 6 hours PRN  oxyCODONE    IR 5 milliGRAM(s) Oral every 3 hours PRN  oxyCODONE    IR 5 milliGRAM(s) Oral once PRN  oxytocin Infusion 42 milliUNIT(s)/Min IV Continuous <Continuous>  simethicone 80 milliGRAM(s) Chew every 4 hours PRN  
Post-op day 3  Doing well, +flatus, +BM. Pain is well-controlled, breastfeeding  VS- stable, afebrile  Breast- full, lactating. no erythema or nipple crack  Abd- soft, appropriately distended, +BS, Incision dry and clean, no drainage or erythema.  Pelvic- Lochia rubra, mildflow  Ext- Trace pedal edema, Therese's negative  Labs: CBC~                      8.2    9.93  )-----------( 167      ( 06 Feb 2025 05:30 )             24.9     Imp: Post-op day 3 stable/anemia due to acute blood loss  Plan: d/c home         f/u 1 wk for wound check         continue Iron and prenatal vit            Ambulate            Elevate legs  
Patient evaluated at bedside this morning on POD#3 resting comfortable in bed.   She reports pain is well controlled with acetaminophen and ibuprofen.  She denies headache, dizziness, chest pain, palpitations, shortness of breath, nausea, vomiting, or heavy vaginal bleeding.  She has been ambulating without assistance, voiding spontaneously, passing gas, tolerating regular diet, and is breastfeeding.    Physical Exam:  Vital Signs Last 24 Hrs  T(C): 36.6 (06 Feb 2025 05:30), Max: 36.7 (05 Feb 2025 10:09)  T(F): 97.8 (06 Feb 2025 05:30), Max: 98.1 (05 Feb 2025 10:09)  HR: 79 (06 Feb 2025 05:30) (78 - 88)  BP: 109/70 (06 Feb 2025 05:30) (105/69 - 112/75)  BP(mean): --  RR: 17 (06 Feb 2025 05:30) (17 - 18)  SpO2: 98% (06 Feb 2025 05:30) (97% - 99%)    Parameters below as of 05 Feb 2025 13:56  Patient On (Oxygen Delivery Method): room air    GA: comfortable in NAD  Abd: soft, nontender, nondistended, no rebound or guarding, incision clean, dry and intact, uterus firm at midline, 2 fb below umbilicus  : lochia WNL  Extremities: no swelling or calf tenderness                   
Post-op day 2  Doing well, +flatus, no BM. Pain is well-controlled, breastfeeding  VS- stable, afebrile  Breast- full, lactating. no erythema or nipple crack  Abd- soft, appropriately distended, +BS, Incision dry and clean, no drainage or erythema.  Pelvic- Lochia rubra, mildflow  Ext- Trace pedal edema, Therese's negative  Labs: CBC in am  Imp: Post-op day 2 stable  Plan: Advance diet             cbc in am            Ambulate            Elevate legs  
Post-op day 1  Doing well, +flatus, no BM. Pain is well-controlled, breastfeeding. Denies heavy lochia. s/p Iron infusion. No dizziness  VS- stable, afebrile Bp is stable  Breast- full, lactating. no erythema or nipple crack  Abd- soft, appropriately distended, +BS, Incision dry and clean, no drainage or erythema.  Pelvic- Lochia rubra, mildflow  Ext- Trace pedal edema, Therese's negative  Labs: CBC                      8.7    8.92  )-----------( 147      ( 04 Feb 2025 05:30 ) (from 11.7)             26.4     Imp: Post-op day 1 stable/anemia due to acute blood loss  Plan: Advance diet           monitor bleeding           rept cbc on POD 3            Ambulate            Elevate legs

## 2025-02-06 NOTE — PROGRESS NOTE ADULT - ASSESSMENT
A/P 34y  s/p c/s, POD #2, stable  -  Pain: PO motrin, percocet  -  GI: reg diet  -  : s/p suresh, voiding spontaneously  -  DVT prophylaxis: ambulation, SCDs, SQH  -  Dispo: POD 3 or 4

## 2025-02-06 NOTE — DISCHARGE NOTE OB - FINANCIAL ASSISTANCE
[facility] provides services at a reduced cost to those who are determined to be eligible through [facility]’s financial assistance program. Information regarding [facility]’s financial assistance program can be found by going to [email or web address] or by calling [phone].

## 2025-02-06 NOTE — DISCHARGE NOTE OB - CARE PROVIDER_API CALL
Kavon Donaldson  Obstetrics and Gynecology  [address]  [place] [address]  Phone: [phone]  Fax: [phone]  Follow Up Time: 2 weeks

## 2025-02-06 NOTE — DISCHARGE NOTE OB - PATIENT PORTAL LINK FT
You can access the FollowMyHealth Patient Portal offered by [facility] by registering at the following website: [email or web address]. By joining [facility]’s FollowMyHealth portal, you will also be able to view your health information using other applications (apps) compatible with our system.

## 2025-02-10 DIAGNOSIS — O34.219 MATERNAL CARE FOR UNSPECIFIED TYPE SCAR FROM PREVIOUS CESAREAN DELIVERY: ICD-10-CM

## 2025-02-10 DIAGNOSIS — Z3A.39 39 WEEKS GESTATION OF PREGNANCY: ICD-10-CM

## 2025-02-10 DIAGNOSIS — D62 ACUTE POSTHEMORRHAGIC ANEMIA: ICD-10-CM

## 2025-02-10 DIAGNOSIS — O26.833 PREGNANCY RELATED RENAL DISEASE, THIRD TRIMESTER: ICD-10-CM
